# Patient Record
Sex: MALE | Race: WHITE | NOT HISPANIC OR LATINO | Employment: OTHER | URBAN - METROPOLITAN AREA
[De-identification: names, ages, dates, MRNs, and addresses within clinical notes are randomized per-mention and may not be internally consistent; named-entity substitution may affect disease eponyms.]

---

## 2024-05-24 ENCOUNTER — HOSPITAL ENCOUNTER (EMERGENCY)
Facility: HOSPITAL | Age: 69
Discharge: HOME | End: 2024-05-24
Attending: EMERGENCY MEDICINE
Payer: MEDICARE

## 2024-05-24 ENCOUNTER — APPOINTMENT (OUTPATIENT)
Dept: RADIOLOGY | Facility: HOSPITAL | Age: 69
End: 2024-05-24
Payer: MEDICARE

## 2024-05-24 VITALS
WEIGHT: 200 LBS | OXYGEN SATURATION: 97 % | DIASTOLIC BLOOD PRESSURE: 116 MMHG | HEIGHT: 62 IN | RESPIRATION RATE: 16 BRPM | SYSTOLIC BLOOD PRESSURE: 155 MMHG | TEMPERATURE: 98.8 F | HEART RATE: 84 BPM | BODY MASS INDEX: 36.8 KG/M2

## 2024-05-24 DIAGNOSIS — S80.11XA MULTIPLE LEG CONTUSIONS, RIGHT, INITIAL ENCOUNTER: ICD-10-CM
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
## 2024-05-24 PROBLEM — H05.20 EXOPHTHALMOS: Status: ACTIVE | Noted: 2024-05-24

## 2024-05-24 PROCEDURE — 99283 EMERGENCY DEPT VISIT LOW MDM: CPT

## 2024-05-24 PROCEDURE — 73590 X-RAY EXAM OF LOWER LEG: CPT | Mod: RT

## 2024-05-24 PROCEDURE — 73590 X-RAY EXAM OF LOWER LEG: CPT | Mod: RIGHT SIDE | Performed by: RADIOLOGY

## 2024-05-24 ASSESSMENT — LIFESTYLE VARIABLES
EVER HAD A DRINK FIRST THING IN THE MORNING TO STEADY YOUR NERVES TO GET RID OF A HANGOVER: NO
HAVE PEOPLE ANNOYED YOU BY CRITICIZING YOUR DRINKING: NO
EVER FELT BAD OR GUILTY ABOUT YOUR DRINKING: NO
HAVE YOU EVER FELT YOU SHOULD CUT DOWN ON YOUR DRINKING: NO
TOTAL SCORE: 0

## 2024-05-24 ASSESSMENT — PAIN SCALES - GENERAL: PAINLEVEL_OUTOF10: 4

## 2024-05-24 ASSESSMENT — PAIN DESCRIPTION - LOCATION: LOCATION: BACK

## 2024-05-24 ASSESSMENT — PAIN - FUNCTIONAL ASSESSMENT
PAIN_FUNCTIONAL_ASSESSMENT: 0-10
PAIN_FUNCTIONAL_ASSESSMENT: 0-10

## 2024-05-24 ASSESSMENT — COLUMBIA-SUICIDE SEVERITY RATING SCALE - C-SSRS
1. IN THE PAST MONTH, HAVE YOU WISHED YOU WERE DEAD OR WISHED YOU COULD GO TO SLEEP AND NOT WAKE UP?: NO
6. HAVE YOU EVER DONE ANYTHING, STARTED TO DO ANYTHING, OR PREPARED TO DO ANYTHING TO END YOUR LIFE?: NO
2. HAVE YOU ACTUALLY HAD ANY THOUGHTS OF KILLING YOURSELF?: NO

## 2024-05-24 ASSESSMENT — PAIN DESCRIPTION - PAIN TYPE: TYPE: ACUTE PAIN

## 2024-05-24 ASSESSMENT — PAIN DESCRIPTION - ORIENTATION: ORIENTATION: RIGHT

## 2024-05-24 ASSESSMENT — PAIN DESCRIPTION - DESCRIPTORS: DESCRIPTORS: ACHING
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
## 2024-05-28 ENCOUNTER — TELEPHONE (OUTPATIENT)
Dept: PRIMARY CARE | Facility: CLINIC | Age: 69
End: 2024-05-28
Payer: MEDICARE
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
## 2024-05-29 NOTE — TELEPHONE ENCOUNTER
Called patient and made aware that he has not been seen since 2020 and that would make him a new patient.  Dr. Kaur stated that he would need to a new PCP due to the length of time since he has been seen at our office.  Gave patient the number for new PCP.

## 2024-11-14 ENCOUNTER — APPOINTMENT (OUTPATIENT)
Dept: PRIMARY CARE | Facility: CLINIC | Age: 69
End: 2024-11-14
Payer: MEDICARE

## 2024-11-19 ENCOUNTER — APPOINTMENT (OUTPATIENT)
Dept: PRIMARY CARE | Facility: CLINIC | Age: 69
End: 2024-11-19
Payer: MEDICARE

## 2024-11-26 ENCOUNTER — OFFICE VISIT (OUTPATIENT)
Dept: PRIMARY CARE | Facility: CLINIC | Age: 69
End: 2024-11-26
Payer: MEDICARE

## 2024-11-26 VITALS
DIASTOLIC BLOOD PRESSURE: 80 MMHG | HEART RATE: 64 BPM | WEIGHT: 229 LBS | HEIGHT: 62 IN | TEMPERATURE: 97.4 F | OXYGEN SATURATION: 96 % | BODY MASS INDEX: 42.14 KG/M2 | SYSTOLIC BLOOD PRESSURE: 130 MMHG

## 2024-11-26 DIAGNOSIS — N52.9 ERECTILE DYSFUNCTION, UNSPECIFIED ERECTILE DYSFUNCTION TYPE: ICD-10-CM

## 2024-11-26 DIAGNOSIS — R73.9 HYPERGLYCEMIA: ICD-10-CM

## 2024-11-26 DIAGNOSIS — I10 PRIMARY HYPERTENSION: Primary | ICD-10-CM

## 2024-11-26 DIAGNOSIS — E78.5 HYPERLIPIDEMIA, UNSPECIFIED HYPERLIPIDEMIA TYPE: ICD-10-CM

## 2024-11-26 PROCEDURE — 1160F RVW MEDS BY RX/DR IN RCRD: CPT | Performed by: FAMILY MEDICINE

## 2024-11-26 PROCEDURE — 99204 OFFICE O/P NEW MOD 45 MIN: CPT | Performed by: FAMILY MEDICINE

## 2024-11-26 PROCEDURE — 1124F ACP DISCUSS-NO DSCNMKR DOCD: CPT | Performed by: FAMILY MEDICINE

## 2024-11-26 PROCEDURE — 1159F MED LIST DOCD IN RCRD: CPT | Performed by: FAMILY MEDICINE

## 2024-11-26 PROCEDURE — 3075F SYST BP GE 130 - 139MM HG: CPT | Performed by: FAMILY MEDICINE

## 2024-11-26 PROCEDURE — 3008F BODY MASS INDEX DOCD: CPT | Performed by: FAMILY MEDICINE

## 2024-11-26 PROCEDURE — 99214 OFFICE O/P EST MOD 30 MIN: CPT | Performed by: FAMILY MEDICINE

## 2024-11-26 PROCEDURE — 1126F AMNT PAIN NOTED NONE PRSNT: CPT | Performed by: FAMILY MEDICINE

## 2024-11-26 PROCEDURE — 3079F DIAST BP 80-89 MM HG: CPT | Performed by: FAMILY MEDICINE

## 2024-11-26 RX ORDER — LISINOPRIL 10 MG/1
10 TABLET ORAL
COMMUNITY
Start: 2024-10-28 | End: 2024-11-26 | Stop reason: SDUPTHER

## 2024-11-26 RX ORDER — LISINOPRIL 10 MG/1
10 TABLET ORAL
Qty: 90 TABLET | Refills: 1 | Status: SHIPPED | OUTPATIENT
Start: 2024-11-26 | End: 2025-05-25

## 2024-11-26 RX ORDER — SILDENAFIL 100 MG/1
100 TABLET, FILM COATED ORAL DAILY PRN
Qty: 30 TABLET | Refills: 1 | Status: SHIPPED | OUTPATIENT
Start: 2024-11-26

## 2024-11-26 ASSESSMENT — PATIENT HEALTH QUESTIONNAIRE - PHQ9
2. FEELING DOWN, DEPRESSED OR HOPELESS: NOT AT ALL
SUM OF ALL RESPONSES TO PHQ9 QUESTIONS 1 AND 2: 0
1. LITTLE INTEREST OR PLEASURE IN DOING THINGS: NOT AT ALL

## 2024-11-26 ASSESSMENT — PAIN SCALES - GENERAL: PAINLEVEL_OUTOF10: 0-NO PAIN

## 2024-11-26 NOTE — ASSESSMENT & PLAN NOTE
- Blood pressure stable in office today  -Will continue on current lisinopril regimen without modification  -Recent blood work reviewed with plans to focus on healthy, low-fat diet with moderation of carbohydrates/sugars  -Encouraged follow-up in 3 months to monitor blood pressure and check on lifestyle modification regards to cholesterol/sugar levels; can be completed as well as an  -Continue with current regimen along with healthy, balanced diet and moderation of salt/caffeine/alcohol

## 2024-11-26 NOTE — PROGRESS NOTES
Outpatient Visit Note    Chief Complaint   Patient presents with    New Patient Visit     HTN         HPI:  Donn Donovan is a 69 y.o. male who presents to the office as a new patient secondary to blood pressure concerns.    Last panel blood work completed in 2019 by previous PCP including CBC, CMP, TSH, lipid panel, PSA and testosterone.  Blood work was remarkable for mild hyperglycemia and mildly elevated LDL cholesterol levels.    He reports noted elevation of his blood pressure when a dentist to have tooth pulled.  Attempted to follow-up with previous primary care though he is no longer accepting new patients and he had not been seen in over 3 years.  [facility] which time he was started on lisinopril 10 mg.  Had never been on antihypertensive medication in the past though had borderline elevated blood pressures.  Panel blood work was additionally completed including CBC, CMP, lipid panel and TSH which was remarkable for hyperglycemia hyperlipidemia patient was encouraged to focus on dietary modification.  Denies any chest pain, shortness of breath, lightheadedness or dizziness.  Medication has been well-tolerated to which she reports compliance.  Does not check blood pressures at home the unit continues to show elevated readings at 160s systolic.  Is unsure if unit is operating correctly.    He additionally notes history of erectile dysfunction for which he has had success using sildenafil 50 mg daily.  Notes that effectiveness has waned over time.  He is requesting with interest in trying a stronger dose.    ADV: None on file    Current Medications  Current Outpatient Medications   Medication Instructions    lisinopril 10 mg, oral, Daily RT    sildenafil (VIAGRA) 100 mg, oral, Daily PRN        Allergies  Allergies   Allergen Reactions    Morphine Nausea/vomiting        Past Medical History:   Diagnosis Date    Hypertension       Past Surgical History:   Procedure  Laterality Date    SHOULDER SURGERY  10/30/2013    Shoulder Surgery     No family history on file.  Social History     Tobacco Use    Smoking status: Unknown   Vaping Use    Vaping status: Never Used   Substance Use Topics    Alcohol use: Never    Drug use: Never       ROS  All pertinent positive symptoms are included in the history of present illness.  All other systems have been reviewed and are negative and noncontributory to this patient's current ailments.    VITAL SIGNS  Vitals:    11/26/24 1115   BP: 130/80   Pulse: 64   Temp: 36.3 °C (97.4 °F)   SpO2: 96%       PHYSICAL EXAM  GENERAL APPEARANCE: alert and oriented, Pleasant and cooperative, No Acute Distress  HEENT: EOMI, PERRLA, MMM  HEART: RRR, normal S1S2, no murmurs, click or rubs  LUNGS: clear to auscultation bilaterally, no wheezes/rhonchi/rales  EXTREMITIES: no edema, normal ROM  SKIN: normal, no rash, unremarkable  NEUROLOGIC EXAM: non-focal exam  MUSCULOSKELETAL: no gross abnormalities  PSYCH: affect is normal, eye contact is good      Assessment/Plan   Problem List Items Addressed This Visit             ICD-10-CM    Primary hypertension - Primary I10     - Blood pressure stable in office today  -Will continue on current lisinopril regimen without modification  -Recent blood work reviewed with plans to focus on healthy, low-fat diet with moderation of carbohydrates/sugars  -Encouraged follow-up in 3 months to monitor blood pressure and check on lifestyle modification regards to cholesterol/sugar levels; can be completed as well as an  -Continue with current regimen along with healthy, balanced diet and moderation of salt/caffeine/alcohol         Relevant Medications    lisinopril 10 mg tablet    Erectile dysfunction N52.9     - Will continue with as needed sildenafil with recommendations to try potential 100 mg dose; prescription sent         Relevant Medications    sildenafil (Viagra) 100 mg tablet    Hyperglycemia R73.9     - Advocated focus on  healthy, low-fat diet with moderation of carbohydrates with plans to check A1c next panel blood work         Hyperlipidemia E78.5     - Encouraged healthy, low-fat diet with regular physical activity with overall goal of weight loss            Counseling:       Medication education:         Education:  The patient is counseled regarding potential side-effects of all new medications        Understanding:  Patient expressed understanding        Adherence:  No barriers to adherence identified    ** Please excuse any errors in grammar or translation related to this dictation. Voice recognition software was utilized to prepare this document. **

## 2024-11-26 NOTE — PATIENT INSTRUCTIONS
Problem List Items Addressed This Visit             ICD-10-CM    Primary hypertension - Primary I10     - Blood pressure stable in office today  -Will continue on current lisinopril regimen without modification  -Recent blood work reviewed with plans to focus on healthy, low-fat diet with moderation of carbohydrates/sugars  -Encouraged follow-up in 3 months to monitor blood pressure and check on lifestyle modification regards to cholesterol/sugar levels; can be completed as well as an  -Continue with current regimen along with healthy, balanced diet and moderation of salt/caffeine/alcohol         Relevant Medications    lisinopril 10 mg tablet    Erectile dysfunction N52.9     - Will continue with as needed sildenafil with recommendations to try potential 100 mg dose; prescription sent         Relevant Medications    sildenafil (Viagra) 100 mg tablet    Hyperglycemia R73.9     - Advocated focus on healthy, low-fat diet with moderation of carbohydrates with plans to check A1c next panel blood work         Hyperlipidemia E78.5     - Encouraged healthy, low-fat diet with regular physical activity with overall goal of weight loss            Counseling:       Medication education:         Education:  The patient is counseled regarding potential side-effects of all new medications        Understanding:  Patient expressed understanding        Adherence:  No barriers to adherence identified    ** Please excuse any errors in grammar or translation related to this dictation. Voice recognition software was utilized to prepare this document. **

## 2024-11-26 NOTE — ASSESSMENT & PLAN NOTE
- Advocated focus on healthy, low-fat diet with moderation of carbohydrates with plans to check A1c next panel blood work

## 2024-11-26 NOTE — ASSESSMENT & PLAN NOTE
- Will continue with as needed sildenafil with recommendations to try potential 100 mg dose; prescription sent

## 2024-11-26 NOTE — ASSESSMENT & PLAN NOTE
- Encouraged healthy, low-fat diet with regular physical activity with overall goal of weight loss

## 2024-12-27 ENCOUNTER — APPOINTMENT (OUTPATIENT)
Dept: PRIMARY CARE | Facility: CLINIC | Age: 69
End: 2024-12-27
Payer: MEDICARE

## 2025-02-27 ENCOUNTER — OFFICE VISIT (OUTPATIENT)
Dept: PRIMARY CARE | Facility: CLINIC | Age: 70
End: 2025-02-27
Payer: MEDICARE

## 2025-02-27 VITALS
SYSTOLIC BLOOD PRESSURE: 124 MMHG | HEART RATE: 69 BPM | BODY MASS INDEX: 40.85 KG/M2 | HEIGHT: 62 IN | TEMPERATURE: 96.6 F | DIASTOLIC BLOOD PRESSURE: 70 MMHG | OXYGEN SATURATION: 98 % | WEIGHT: 222 LBS

## 2025-02-27 DIAGNOSIS — R73.9 HYPERGLYCEMIA: ICD-10-CM

## 2025-02-27 DIAGNOSIS — Z00.00 ROUTINE ADULT HEALTH MAINTENANCE: Primary | ICD-10-CM

## 2025-02-27 DIAGNOSIS — I10 PRIMARY HYPERTENSION: ICD-10-CM

## 2025-02-27 DIAGNOSIS — Z11.59 NEED FOR HEPATITIS C SCREENING TEST: ICD-10-CM

## 2025-02-27 DIAGNOSIS — K22.719 BARRETT'S ESOPHAGUS WITH DYSPLASIA: ICD-10-CM

## 2025-02-27 DIAGNOSIS — E66.01 OBESITY, MORBID (MULTI): ICD-10-CM

## 2025-02-27 DIAGNOSIS — N52.9 ERECTILE DYSFUNCTION, UNSPECIFIED ERECTILE DYSFUNCTION TYPE: ICD-10-CM

## 2025-02-27 DIAGNOSIS — E78.5 HYPERLIPIDEMIA, UNSPECIFIED HYPERLIPIDEMIA TYPE: ICD-10-CM

## 2025-02-27 DIAGNOSIS — Z12.5 PROSTATE CANCER SCREENING: ICD-10-CM

## 2025-02-27 PROCEDURE — 1159F MED LIST DOCD IN RCRD: CPT | Performed by: FAMILY MEDICINE

## 2025-02-27 PROCEDURE — 1036F TOBACCO NON-USER: CPT | Performed by: FAMILY MEDICINE

## 2025-02-27 PROCEDURE — 3008F BODY MASS INDEX DOCD: CPT | Performed by: FAMILY MEDICINE

## 2025-02-27 PROCEDURE — 1160F RVW MEDS BY RX/DR IN RCRD: CPT | Performed by: FAMILY MEDICINE

## 2025-02-27 PROCEDURE — 99214 OFFICE O/P EST MOD 30 MIN: CPT | Performed by: FAMILY MEDICINE

## 2025-02-27 PROCEDURE — 1124F ACP DISCUSS-NO DSCNMKR DOCD: CPT | Performed by: FAMILY MEDICINE

## 2025-02-27 PROCEDURE — 99214 OFFICE O/P EST MOD 30 MIN: CPT | Mod: 25 | Performed by: FAMILY MEDICINE

## 2025-02-27 PROCEDURE — 99215 OFFICE O/P EST HI 40 MIN: CPT | Performed by: FAMILY MEDICINE

## 2025-02-27 PROCEDURE — G0439 PPPS, SUBSEQ VISIT: HCPCS | Performed by: FAMILY MEDICINE

## 2025-02-27 PROCEDURE — 3078F DIAST BP <80 MM HG: CPT | Performed by: FAMILY MEDICINE

## 2025-02-27 PROCEDURE — 3074F SYST BP LT 130 MM HG: CPT | Performed by: FAMILY MEDICINE

## 2025-02-27 PROCEDURE — 1126F AMNT PAIN NOTED NONE PRSNT: CPT | Performed by: FAMILY MEDICINE

## 2025-02-27 RX ORDER — LISINOPRIL 10 MG/1
10 TABLET ORAL
Qty: 90 TABLET | Refills: 3 | Status: SHIPPED | OUTPATIENT
Start: 2025-02-27 | End: 2026-02-27

## 2025-02-27 ASSESSMENT — PATIENT HEALTH QUESTIONNAIRE - PHQ9
1. LITTLE INTEREST OR PLEASURE IN DOING THINGS: NOT AT ALL
SUM OF ALL RESPONSES TO PHQ9 QUESTIONS 1 AND 2: 0
2. FEELING DOWN, DEPRESSED OR HOPELESS: NOT AT ALL

## 2025-02-27 ASSESSMENT — PAIN SCALES - GENERAL: PAINLEVEL_OUTOF10: 0-NO PAIN

## 2025-02-27 NOTE — ASSESSMENT & PLAN NOTE
-Continue with healthy, low-fat diet and moderation of carbohydrates along with regular physical activity

## 2025-02-27 NOTE — PROGRESS NOTES
outpatient Visit Note    Chief Complaint   Patient presents with    Annual Exam       HPI:  Donn Donovan is a 69 y.o. male who presents to the office for an annual Medicare Wellness Visit.    He was last seen in the office on 11/26/2024 as a new patient secondary to blood pressure concerns.    Last panel blood work completed in 2019 by previous PCP including CBC, CMP, TSH, lipid panel, PSA and testosterone.  Blood work was remarkable for mild hyperglycemia and mildly elevated LDL cholesterol levels.    He reports noted elevation of his blood pressure when a dentist to have tooth pulled.  Attempted to follow-up with previous primary care though he is no longer accepting new patients and he had not been seen in over 3 years.  [facility] at which time he was started on lisinopril 10 mg.  Had never been on antihypertensive medication in the past though had borderline elevated blood pressures.  Panel blood work was additionally completed including CBC, CMP, lipid panel and TSH which was remarkable for hyperglycemia and hyperlipidemia.  Patient was encouraged to focus on dietary modification.  Denies any chest pain, shortness of breath, lightheadedness or dizziness.  Medication has been well-tolerated to which he reported compliance, with blood pressure stable in office at visit    Well Exam:  Overall, they describes their health as good with no reports of recent illness or hospitalization. No reported issues of chest pain, shortness of breath, headaches, vision/hearing changes, abdominal pain, vomiting, diarrhea, melena, hematochezia, constipation or urinary symptoms.    Preventative Health Maintenance:  In regards to preventative health maintenance, last Tdap unknown.  Flu shot not typically. Pneumonia vaccination series not pursued to date. In regards to CRC screening, no prior screening on file. Shingles vaccination series started. COVID-19 vaccination series not pursued  to date.           He additionally notes history of erectile dysfunction for which he has had success using sildenafil 50 mg daily.  Notes that effectiveness has waned over time.  He is requesting with interest in trying a stronger dose.    ADV: None on file    Hearing screen: reports no difficulty with hearing and passes finger rub test bilaterally    Does the patient use opioid medications: No  Name of medication: N/A  If yes, do they take this medicine appropriately: N/A    How does the patient rate their health status today: Good    Cognitive Screen:  AAAx3  to person, place and time: Yes  3 word recall: Apple, Car, Shoe - Immediate recall: Yes        - 5 minutes recall: Yes  Impression: No cognitive deficiency observed during screening or encounter today      Reviewed:   Past Medical History/Allergies:  Yes  Family History:  Yes  Social History:  Yes  Current Medications:  Yes  Vital Signs:  Yes  Advanced Directives:  discussed  Immunizations:  reviewed today  Home Safety:                    Up & Go test > 30 seconds?  No                   Home have rugs; lack grab bars in bathroom; lack handrail on stairs; have poor lighting?  No                   Hearing difficulties?  No  Geriatric Assessment                   ADL areas requiring assistance:  Does not need help with Dressing, Eating, Ambulating, Toileting, Grooming, Hygiene.                    IADL areas requiring assistance:  Does not need help with Shopping, Housework, Accounting, Transportation, Driving.   Medications: reviewed  Current supplements:  Reviewed and recorded.   Other providers: Reviewed and recorded - Current providers and suppliers: Dr. Davies.       Past Medical History:   Diagnosis Date    Hypertension         Current Medications  Current Outpatient Medications   Medication Instructions    lisinopril 10 mg, oral, Daily RT    sildenafil (VIAGRA) 100 mg, oral, Daily PRN        Allergies  Allergies   Allergen Reactions    Morphine  Nausea/vomiting        Immunizations  There is no immunization history for the selected administration types on file for this patient.       Past Surgical History:   Procedure Laterality Date    SHOULDER SURGERY  10/30/2013    Shoulder Surgery     No family history on file.  Social History     Tobacco Use    Smoking status: Never    Smokeless tobacco: Never   Vaping Use    Vaping status: Never Used   Substance Use Topics    Alcohol use: Never    Drug use: Never     Tobacco Use: Low Risk  (2/27/2025)    Patient History     Smoking Tobacco Use: Never     Smokeless Tobacco Use: Never     Passive Exposure: Not on file        ROS  All pertinent positive symptoms are included in the history of present illness.  All other systems have been reviewed and are negative and noncontributory to this patient's current ailments.    VITAL SIGNS  Vitals:    02/27/25 0806   BP: 124/70   Pulse: 69   Temp: 35.9 °C (96.6 °F)   SpO2: 98%     Vitals:    02/27/25 0806   Weight: 101 kg (222 lb)      Body mass index is 40.6 kg/m².     PHYSICAL EXAM  GENERAL APPEARANCE: well nourished, well developed, looks like stated age, in no acute distress, not ill or tired appearing, conversing well.   HEENT: no trauma, normocephalic. PERRLA and EOMI with normal external exam. TM's intact with no injection or effusion, no signs of infection. Nares patent, turbinates pink without discharge. Pharynx pink with no exudates or lesions, no enlarged tonsils.   NECK: no nodes, supple without rigidity, no neck mass was observed, no thyromegaly or thyroid nodules.   HEART: regular rate and rhythm, S1 and S2 heard with no murmurs or skipped beats  LUNGS: clear to auscultation bilaterally with no wheezes, crackles or rales.   ABDOMEN: no organomegaly, soft, nontender, nondistended, no guarding/rebound/rigidity.   EXTREMITIES: moving all extremities equally with no edema or deformities.   SKIN: normal skin color and pigmentation, normal skin turgor without rash,  lesions, or nodules visualized.   NEUROLOGIC EXAM: CN II-XII grossly intact, normal gait, normal balance, 5/5 muscle strength, sensation grossly intact.   PSYCH: mood and affect appropriate; alert and oriented to time, place, person; no difficulty with speech or language.       Preventative Services reviewed with patient and copy printed for patient.    Assessment/Plan   Problem List Items Addressed This Visit             ICD-10-CM    Johnson's esophagus K22.70     - stable         Primary hypertension I10     - Blood pressure stable in office today  -Will continue on current lisinopril regimen without modification  -Recent blood work reviewed with plans to focus on healthy, low-fat diet with moderation of carbohydrates/sugars  -Encouraged follow-up in 3 months to monitor blood pressure and check on lifestyle modification regards to cholesterol/sugar levels; can be completed as well as an  -Continue with current regimen along with healthy, balanced diet and moderation of salt/caffeine/alcohol         Relevant Medications    lisinopril 10 mg tablet    Other Relevant Orders    TSH with reflex to Free T4 if abnormal    Lipid Panel    Comprehensive Metabolic Panel    CBC    Erectile dysfunction N52.9     - Will continue with as needed sildenafil with recommendations to try potential 100 mg dose         Hyperglycemia R73.9     - Will check A1c         Relevant Orders    Hemoglobin A1c    Comprehensive Metabolic Panel    Hyperlipidemia E78.5     - Encouraged healthy, low-fat diet with regular physical activity with overall goal of weight loss         Relevant Orders    TSH with reflex to Free T4 if abnormal    Lipid Panel    Comprehensive Metabolic Panel    Obesity, morbid (Multi) E66.01     -Continue with healthy, low-fat diet and moderation of carbohydrates along with regular physical activity          Other Visit Diagnoses         Codes    Routine adult health maintenance    -  Primary Z00.00    Relevant Orders     Hemoglobin A1c    TSH with reflex to Free T4 if abnormal    Lipid Panel    Comprehensive Metabolic Panel    CBC    Prostate Spec.Ag,Screen    Hepatitis C antibody    Prostate cancer screening     Z12.5    Relevant Orders    Prostate Spec.Ag,Screen    Need for hepatitis C screening test     Z11.59    Relevant Orders    Hepatitis C antibody            Additional Visit Plans:  Notes:  PREVENTATIVE HEALTH SCREENINGS INCLUDED:  - Blood pressure screen.  - Blood work may include a cholesterol and diabetes screen if risk factors exist (overweight, high blood pressure etc); screening for sexually transmitted infections; a one time HIV screen for all individuals, and a one time Hepatitis C Virus screen for those born between 4972-6433.  - I encourage you to eat a low-fat, moderate-carbohydrate, low-calorie diet to maintain a normal BMI (under 25) to reduce heart disease, and risk for diabetes  - Moderate intensity exercise for 30 minutes 5 days per week is recommended  - Along with recommendations for nutrition and exercise discussed today helpful resources recommended by the American Academy of Family Practice can be found at www.familydoctor.org or www.choosemyplate.gov    - Colon cancer screening for all ages 45-75 or 85 years old periodically depending on results.  - Cervical cancer screening (pap test) in women between 21-65 years old, periodically depending on results.  - Mammogram screening for breast cancer in women starting at 40-50 years and every 1-2 years.  - For men and women who have a 30 pack year smoking history and currently smoke or have quit in the past 15 years, screening for lung cancer with a yearly low dose CT scan is recommended starting at age 55 until age 80 years  - For men only who have smoked 100+ cigarettes anytime in their lifetime, a one time ultrasound screening for for abdominal aortic aneurysms starting at age 65 until 75 years old  - Bone density screening (DEXA) for osteoporosis in women  aged 65 years and older, once every two years if needed.    IMMUNIZATIONS:  - Flu shot annually.  - Tetanus booster every 10 years.  - Two pneumococcal vaccinations after 65 years old.  - Shingles vaccine for those 50 years or older.     This was a shared decision making visit.    Next Wellness Exam Due  In 1 year from today    Counseling:       Medication education:         Education:  The patient is counseled regarding potential side-effects of all new medications        Understanding:  Patient expressed understanding        Adherence:  No barriers to adherence identified    ** Please excuse any errors in grammar or translation related to this dictation. Voice recognition software was utilized to prepare this document. **

## 2025-02-27 NOTE — PATIENT INSTRUCTIONS
Problem List Items Addressed This Visit             ICD-10-CM    Johnson's esophagus K22.70     - stable         Primary hypertension I10     - Blood pressure stable in office today  -Will continue on current lisinopril regimen without modification  -Recent blood work reviewed with plans to focus on healthy, low-fat diet with moderation of carbohydrates/sugars  -Encouraged follow-up in 3 months to monitor blood pressure and check on lifestyle modification regards to cholesterol/sugar levels; can be completed as well as an  -Continue with current regimen along with healthy, balanced diet and moderation of salt/caffeine/alcohol         Relevant Medications    lisinopril 10 mg tablet    Other Relevant Orders    TSH with reflex to Free T4 if abnormal    Lipid Panel    Comprehensive Metabolic Panel    CBC    Erectile dysfunction N52.9     - Will continue with as needed sildenafil with recommendations to try potential 100 mg dose         Hyperglycemia R73.9     - Will check A1c         Relevant Orders    Hemoglobin A1c    Comprehensive Metabolic Panel    Hyperlipidemia E78.5     - Encouraged healthy, low-fat diet with regular physical activity with overall goal of weight loss         Relevant Orders    TSH with reflex to Free T4 if abnormal    Lipid Panel    Comprehensive Metabolic Panel    Obesity, morbid (Multi) E66.01     -Continue with healthy, low-fat diet and moderation of carbohydrates along with regular physical activity          Other Visit Diagnoses         Codes    Routine adult health maintenance    -  Primary Z00.00    Relevant Orders    Hemoglobin A1c    TSH with reflex to Free T4 if abnormal    Lipid Panel    Comprehensive Metabolic Panel    CBC    Prostate Spec.Ag,Screen    Hepatitis C antibody    Prostate cancer screening     Z12.5    Relevant Orders    Prostate Spec.Ag,Screen    Need for hepatitis C screening test     Z11.59    Relevant Orders    Hepatitis C antibody            Additional Visit  Plans:  Notes:  PREVENTATIVE HEALTH SCREENINGS INCLUDED:  - Blood pressure screen.  - Blood work may include a cholesterol and diabetes screen if risk factors exist (overweight, high blood pressure etc); screening for sexually transmitted infections; a one time HIV screen for all individuals, and a one time Hepatitis C Virus screen for those born between 2025-4694.  - I encourage you to eat a low-fat, moderate-carbohydrate, low-calorie diet to maintain a normal BMI (under 25) to reduce heart disease, and risk for diabetes  - Moderate intensity exercise for 30 minutes 5 days per week is recommended  - Along with recommendations for nutrition and exercise discussed today helpful resources recommended by the [employer] of Family [employer] can be found at www.familydoctor.org or www.choosemyplate.gov    - Colon cancer screening for all ages 45-75 or 85 years old periodically depending on results.  - Cervical cancer screening (pap test) in women between 21-65 years old, periodically depending on results.  - Mammogram screening for breast cancer in women starting at 40-50 years and every 1-2 years.  - For men and women who have a 30 pack year smoking history and currently smoke or have quit in the past 15 years, screening for lung cancer with a yearly low dose CT scan is recommended starting at age 55 until age 80 years  - For men only who have smoked 100+ cigarettes anytime in their lifetime, a one time ultrasound screening for for abdominal aortic aneurysms starting at age 65 until 75 years old  - Bone density screening (DEXA) for osteoporosis in women aged 65 years and older, once every two years if needed.    IMMUNIZATIONS:  - Flu shot annually.  - Tetanus booster every 10 years.  - Two pneumococcal vaccinations after 65 years old.  - Shingles vaccine for those 50 years or older.     This was a shared decision making visit.    Next Wellness Exam Due  In 1 year from today    Counseling:       Medication education:          Education:  The patient is counseled regarding potential side-effects of all new medications        Understanding:  Patient expressed understanding        Adherence:  No barriers to adherence identified    ** Please excuse any errors in grammar or translation related to this dictation. Voice recognition software was utilized to prepare this document. **

## 2025-07-05 LAB
ALBUMIN SERPL-MCNC: 4.4 G/DL (ref 3.6–5.1)
ALP SERPL-CCNC: 61 U/L (ref 35–144)
ALT SERPL-CCNC: 27 U/L (ref 9–46)
ANION GAP SERPL CALCULATED.4IONS-SCNC: 9 MMOL/L (CALC) (ref 7–17)
AST SERPL-CCNC: 19 U/L (ref 10–35)
BILIRUB SERPL-MCNC: 0.7 MG/DL (ref 0.2–1.2)
BUN SERPL-MCNC: 19 MG/DL (ref 7–25)
CALCIUM SERPL-MCNC: 9.2 MG/DL (ref 8.6–10.3)
CHLORIDE SERPL-SCNC: 104 MMOL/L (ref 98–110)
CHOLEST SERPL-MCNC: 181 MG/DL
CHOLEST/HDLC SERPL: 4.9 (CALC)
CO2 SERPL-SCNC: 25 MMOL/L (ref 20–32)
CREAT SERPL-MCNC: 1.14 MG/DL (ref 0.7–1.35)
EGFRCR SERPLBLD CKD-EPI 2021: 70 ML/MIN/1.73M2
ERYTHROCYTE [DISTWIDTH] IN BLOOD BY AUTOMATED COUNT: 11.8 % (ref 11–15)
EST. AVERAGE GLUCOSE BLD GHB EST-MCNC: 105 MG/DL
EST. AVERAGE GLUCOSE BLD GHB EST-SCNC: 5.8 MMOL/L
GLUCOSE SERPL-MCNC: 109 MG/DL (ref 65–99)
HBA1C MFR BLD: 5.3 %
HCT VFR BLD AUTO: 41.1 % (ref 38.5–50)
HCV AB SERPL QL IA: NORMAL
HDLC SERPL-MCNC: 37 MG/DL
HGB BLD-MCNC: 14 G/DL (ref 13.2–17.1)
LDLC SERPL CALC-MCNC: 118 MG/DL (CALC)
MCH RBC QN AUTO: 33.3 PG (ref 27–33)
MCHC RBC AUTO-ENTMCNC: 34.1 G/DL (ref 32–36)
MCV RBC AUTO: 97.9 FL (ref 80–100)
NONHDLC SERPL-MCNC: 144 MG/DL (CALC)
PLATELET # BLD AUTO: 279 THOUSAND/UL (ref 140–400)
PMV BLD REES-ECKER: 9.8 FL (ref 7.5–12.5)
POTASSIUM SERPL-SCNC: 4.3 MMOL/L (ref 3.5–5.3)
PROT SERPL-MCNC: 6.9 G/DL (ref 6.1–8.1)
PSA SERPL-MCNC: 0.55 NG/ML
RBC # BLD AUTO: 4.2 MILLION/UL (ref 4.2–5.8)
SODIUM SERPL-SCNC: 138 MMOL/L (ref 135–146)
TRIGL SERPL-MCNC: 144 MG/DL
TSH SERPL-ACNC: 2.52 MIU/L (ref 0.4–4.5)
WBC # BLD AUTO: 7.8 THOUSAND/UL (ref 3.8–10.8)